# Patient Record
(demographics unavailable — no encounter records)

---

## 2024-12-12 NOTE — PHYSICAL EXAM
[General Appearance - Alert] : alert [General Appearance - Well-Appearing] : well appearing [Appearance Of Head] : the head was normocephalic [PERRL With Normal Accommodation] : pupils were equal in size, round, reactive to light, with normal accommodation [Both Tympanic Membranes Were Examined] : both tympanic membranes were normal [Neck Cervical Mass (___cm)] : no neck mass was observed [Auscultation Breath Sounds / Voice Sounds] : clear bilateral breath sounds [Heart Sounds] : normal S1 and S2 [Murmurs] : no murmurs [Bowel Sounds] : normal bowel sounds [Abdomen Soft] : soft [Abnormal Walk] : normal gait [No Visual Abnormalities] : no visible abnormailities [Generalized Lymph Node Enlargement] : no lymphadenopathy [] : no significant rash [Penis Abnormality] : the penis was normal [Scrotum] : the scrotum was normal [Prince Stage _____] : the Prince stage for pubic hair development was [unfilled]  [FreeTextEntry1] : GETS ANXIOUS

## 2024-12-12 NOTE — DISCUSSION/SUMMARY
[FreeTextEntry1] : DISCUSSED NEEDED EVALUATION AND SERVICES REFERRALS PLACED WILL COMPLETE M11Q  WELL VISIT DUE AUGUST 2025

## 2024-12-12 NOTE — DEVELOPMENTAL MILESTONES
[FreeTextEntry3] : LIMITED DIET, MOSTLY STARCHES.  USES A COMMUNICATION DEVICE, LIMITED VERBAL SKILLS NEEDS ASSISTANCE WITH ALL ADLS. IS TOILET TRAINED DAY AND NIGHT PREFERS TO PLAY ALONE BUT IS NOT AGGRESSIVE WITH OTHER KIDS

## 2024-12-12 NOTE — COUNSELING
[Use of Plain Language] : use of plain language [Needs Reinforcement, Referred] : needs reinforcement, referred [FreeTextEntry3] : ACCESS TO CARE

## 2024-12-12 NOTE — HISTORY OF PRESENT ILLNESS
[New - [facility] Care] : a new patient visit to establish care [Mother] : mother [FreeTextEntry1] : AUTISM DX AT AGE 3 IN [place], NEEDS TO ESTABLISH MULTISPECIALTY FOLLOW UP AND CARE HERE TO CONFIRM DIAGNOSIS IN ORDER TO GET SERVICES [FreeTextEntry2] : REFERRED BY MATERNAL UNCLE ( CURRENT PT)  [TextEntry] : BHX:  FT 38 WEEKS INDUCED ( [facility])  7LBS 15 OZ    MEDHX: PHOTOTHERAPY, SPEECH DELAY   AUTISM ( DX AT AGE 3 YEARS) HOSP: FLU  MEDS: NONE ALL: NONE IMM : UTD DEVELOP: SPEECH DELAY AT 18 MONTHS ( DELAYED CARE DUE TO THE PANDEMIC ) INCONSISTENT THERAPIES FROM AGE 3 YEARS, PART OF THE REASON THE [facility]

## 2024-12-20 NOTE — PHYSICAL EXAM
[Well-appearing] : well-appearing [Normocephalic] : normocephalic [No ocular abnormalities] : no ocular abnormalities [Neck supple] : neck supple [No abnormal neurocutaneous stigmata or skin lesions] : no abnormal neurocutaneous stigmata or skin lesions [Straight] : straight [No deformities] : no deformities [Alert] : alert [VFF] : VFF [Pupils reactive to light and accommodation] : pupils reactive to light and accommodation [Full extraocular movements] : full extraocular movements [No nystagmus] : no nystagmus [Normal facial sensation to light touch] : normal facial sensation to light touch [No facial asymmetry or weakness] : no facial asymmetry or weakness [Equal palate elevation] : equal palate elevation [Good shoulder shrug] : good shoulder shrug [Normal tongue movement] : normal tongue movement [Midline tongue, no fasciculations] : midline tongue, no fasciculations [Normal axial and appendicular muscle tone] : normal axial and appendicular muscle tone [Gets up on table without difficulty] : gets up on table without difficulty [Normal finger tapping and fine finger movements] : normal finger tapping and fine finger movements [No abnormal involuntary movements] : no abnormal involuntary movements [5/5 strength in proximal and distal muscles of arms and legs] : 5/5 strength in proximal and distal muscles of arms and legs [2+ biceps] : 2+ biceps [Knee jerks] : knee jerks [Ankle jerks] : ankle jerks [No ankle clonus] : no ankle clonus [No dysmetria on FTNT] : no dysmetria on FTNT [Good walking balance] : good walking balance [Normal gait] : normal gait [de-identified] : patient in no apparent distress  [de-identified] : no resp distress, no retractions  [de-identified] : poor eye contact [de-identified] : non verbal, following some simple commands with repetition and prompting [de-identified] : walks well [de-identified] : Localizes touch and tickle

## 2024-12-20 NOTE — HISTORY OF PRESENT ILLNESS
[FreeTextEntry1] : Presenting for initial evaluation to transfer care of autism spectrum disorder and developmental delays  Patient met early developmental milestones, but noted to have speech regression at 1 year old - previously saying " hi, bye, mommy, daddy", but stopped speaking - Normal audiology evaluation - referred for ST around 1.4 yo but service denied by insurance Suspected autism spectrum disorder at 2 years old  Services:  Formal dx in 2020 after psychologic evaluation by NP ST, OT - after pandemic until Sept 2024 - spotty therapy due to providers LEONARD prior to 1st grade x2 months (40 hours per week) - notable improvement while receiving LEONARD, but has not been able to resume   Development Communicated with gesturing, pointing, using ACC Delayed fine motor development  Normal gross motor development  New concerns:  Episodes of fluttering eyelids and forceful eye blink with finger posturing  - onset within the past year - episodes occurring daily Echolalia within the past year   Consultants:  ENT - Snoring and mouth breathing - pending  Dev peds - pending Neuropsychology evaluation - waitlist for 2026 LEONADR therapy - requesting diagnosis letter from provider (not accepting evaluation from NP in [place])

## 2024-12-20 NOTE — PHYSICAL EXAM
[Well-appearing] : well-appearing [Normocephalic] : normocephalic [No ocular abnormalities] : no ocular abnormalities [Neck supple] : neck supple [No abnormal neurocutaneous stigmata or skin lesions] : no abnormal neurocutaneous stigmata or skin lesions [Straight] : straight [No deformities] : no deformities [Alert] : alert [VFF] : VFF [Pupils reactive to light and accommodation] : pupils reactive to light and accommodation [Full extraocular movements] : full extraocular movements [No nystagmus] : no nystagmus [Normal facial sensation to light touch] : normal facial sensation to light touch [No facial asymmetry or weakness] : no facial asymmetry or weakness [Equal palate elevation] : equal palate elevation [Good shoulder shrug] : good shoulder shrug [Normal tongue movement] : normal tongue movement [Midline tongue, no fasciculations] : midline tongue, no fasciculations [Normal axial and appendicular muscle tone] : normal axial and appendicular muscle tone [Gets up on table without difficulty] : gets up on table without difficulty [Normal finger tapping and fine finger movements] : normal finger tapping and fine finger movements [No abnormal involuntary movements] : no abnormal involuntary movements [5/5 strength in proximal and distal muscles of arms and legs] : 5/5 strength in proximal and distal muscles of arms and legs [2+ biceps] : 2+ biceps [Knee jerks] : knee jerks [Ankle jerks] : ankle jerks [No ankle clonus] : no ankle clonus [No dysmetria on FTNT] : no dysmetria on FTNT [Good walking balance] : good walking balance [Normal gait] : normal gait [de-identified] : patient in no apparent distress  [de-identified] : no resp distress, no retractions  [de-identified] : poor eye contact [de-identified] : non verbal, following some simple commands with repetition and prompting [de-identified] : walks well [de-identified] : Localizes touch and tickle

## 2024-12-20 NOTE — HISTORY OF PRESENT ILLNESS
[FreeTextEntry1] : Presenting for initial evaluation to transfer care of autism spectrum disorder and developmental delays  Patient met early developmental milestones, but noted to have speech regression at 1 year old - previously saying " hi, bye, mommy, daddy", but stopped speaking - Normal audiology evaluation - referred for ST around 1.4 yo but service denied by insurance Suspected autism spectrum disorder at 2 years old  Services:  Formal dx in 2020 after psychologic evaluation by NP ST, OT - after pandemic until Sept 2024 - spotty therapy due to providers LEONARD prior to 1st grade x2 months (40 hours per week) - notable improvement while receiving LEONARD, but has not been able to resume   Development Communicated with gesturing, pointing, using ACC Delayed fine motor development  Normal gross motor development  New concerns:  Episodes of fluttering eyelids and forceful eye blink with finger posturing  - onset within the past year - episodes occurring daily Echolalia within the past year   Consultants:  ENT - Snoring and mouth breathing - pending  Dev peds - pending Neuropsychology evaluation - waitlist for 2026 LEONARD therapy - requesting diagnosis letter from provider (not accepting evaluation from NP in [place])

## 2024-12-20 NOTE — REASON FOR VISIT
[Initial Consultation] : an initial consultation for [Autism] : Autism [Developmental Delay] : developmental delay [Family Member] : family member [Other: _____] : [unfilled]

## 2024-12-20 NOTE — ASSESSMENT
[FreeTextEntry1] : 8 year old with autism spectrum disorder and associated developmental delays in language and fine motor skills, with some repetitive movements. Neurologic examination as above. Recommend proceeding with sedated REEG and MRI brain.

## 2024-12-20 NOTE — BIRTH HISTORY
[At ___ Weeks Gestation] : at [unfilled] weeks gestation [[place] [Normal Vaginal Route] : by normal vaginal route [None] : there were no delivery complications [FreeTextEntry6] : n/a

## 2025-01-16 NOTE — PLAN
[TextEntry] : FORMES FAXED TO MRI DEPARTMENT [id number] AND EMAILED TO MOM TO VERIFY WITH NEURO MRI AND EEG ARE ON THE SAME DAY TO LIMIT ANESTHESIA

## 2025-01-16 NOTE — PHYSICAL EXAM
[General Appearance - Alert] : alert [General Appearance - Well-Appearing] : well appearing [Sclera] : the sclera were normal [Normal Appearance] : was normal in appearance [Auscultation Breath Sounds / Voice Sounds] : clear bilateral breath sounds [Heart Sounds] : normal S1 and S2 [Murmurs] : no murmurs [Abdomen Soft] : soft [Abnormal Walk] : normal gait [FreeTextEntry1] : GETS ANXIOUS

## 2025-01-16 NOTE — HISTORY OF PRESENT ILLNESS
[Preoperative Visit] : for a medical evaluation prior to surgery [Good] : Good [Prior Anesthesia] : No prior anesthesia [Prev Anesthesia Reaction] : no previous anesthesia reaction [Anesthesia Reaction] : no anesthesia reaction [FreeTextEntry1] : MRI AND EEG WITH SEDATION [FreeTextEntry2] : 1/1/25

## 2025-02-26 NOTE — ASSESSMENT
[TextEntry] : IMPRESSION:    Daniel is an 8-4 yo male diagnosed with ASD, speech delay diagnosed at 3 yo of age by DBP NP in [place] who recently moved to [place] and establishing care.  He did not receive services prior to first grade due to pandemic related delays.  He speaks mostly in single words, and uses AAC device, which has improved communication.       He was previously evaluated by Neurology for blinking. Saw Dr. Lopez 12/19/24 and had MRI and EEG, with normal results.      Current School Services: Attends 3rd grade, In self-contained class in [employer] with ST, counseling. Has AAC device.      Medical history significant for snoring.      Cousin, 5y, diagnosed with ASD, deafness- was diagnosed with Brinta-Winter syndrome.      Plan:   -Child and Adolescent Symptom Inventory-5 will be given to parent and teacher and these will be reviewed when available.   -Parent/caregiver will complete Social Responsiveness Scale-2 (SRS-2) and bring to next visit.      -Mother will follow-up for hearing evaluation.    -Parent/caregiver to bring in IEP, previous evaluations.  Will review if additional psychological eval needs to be completed for OPWDD application.    -Mother agreed to Genetics evaluation.   -Will forward special needs dentists ([facility])   -Will forward educational advocate information.   -Mother to videotape snoring.       -Follow up for part 2 of evaluation for developmental testing.      -Diagnoses and recommendations will be discussed at the end of the evaluation.      -Education about assessment: Caregiver's concerns were reviewed at length and discussion was completed about how the assessment would help in determining the underlying reasons for difficulties and needs for interventions and treatments. Counseling was done with regards to the assessment procedure, sources of information, and the range of recommendations.           These concerns can result from a range of medical, behavioral, and developmental disorders. In order to assess the underlying disorders or conditions and depending on previous evaluations, assessments may include:   -developmental testing in order to assess the child's development in multiple domains including cognition, communication, social-emotional, physical, and adaptive   -academic testins   -behavioral functioning through the use of behavioral rating scales, clinical observation, school and parent report, and use of standardized testing and neurobehavioral assessments      Referrals:   Genetics   Ophthalmology       Total time spent: 60 minutes    preparing to see the patient   obtaining new patient history including: examiner reviewed the caregiver's concerns, patient's medical and developmental history, sleep history, current functioning, school history, and feeding habits   documenting clinical information in the electronic or other health record   care coordination: discussed importance of obtaining teacher feedback (school form provided); discussed  and supports.   counseling and educating the patient/family/caregiver regarding ***       Virginia Graff MD, MPH   Developmental Behavioral Pediatrician   Board Certified in General Pediatrics and Developmental Behavioral Pediatrics by the American Board of Pediatrics

## 2025-02-26 NOTE — HISTORY OF PRESENT ILLNESS
[TextEntry] : HISTORY OF PRESENT CONCERNS:   Daniel is an 8-6 yo male diagnosed with ASD, speech delay diagnosed at 3 yo of age by [facility] NP in [place] who recently moved to [place] and establishing care.  He did not receive services prior to first grade due to pandemic related delays.  He speaks mostly in single words, and uses AAC device, which has improved communication.       He was previously evaluated by Neurology for blinking. Saw Dr. Lopez 24 and had MRI and EEG, with normal results.      Current School Services: Attends 3rd grade, In self-contained class in [employer] with ST, counseling. Has AAC device.       Private therapies: No      PREVIOUS EVALUATIONS:    IEP classification: Yes, In self-contained class in [employer] with ST, counseling.  Mom is not sure he was evaluated for OT. Has AAC device.    Mother wants him to be in a non-public school for children with ASD.        Parent will bring IEP and evaluations to upcoming visit.      Current skills and functioning reported as:    Language:    Pediatric Developmental Milestones for language were reported to be delayed.  He was saying "mommy" "daddy" "hi" "bye" at 1.5 years but didn't have ST due to insurance issue.  Received ST, OT briefly after 2y of age, and then the pandemic started so had delayed with consistent services.        He had started consistently getting services in 1st grade.       Language regression reported and now regained it.      Expressive language:    Says a few words.  Decreased intelligibility.  Doesn't speak in full sentences. Says "juice box" (but can be unclear) and then he uses his AAC and now he can communicate. Previously, he pulled and pointed to what he needed. Uses AAC "I want chips" "I want bathroom."      Improved eye contact, but still decreased.  Makes eye contact with familiar people but not consistently.       Has short attention span, easily distracted,       Receptive language:   Most times, he responds to name.     Follows 1 step command, can follow 2 step but not consistently.    Mother says she has to repeat herself for him to complete a task.       Behaviors: Parent reports activity level is very hyperactive- can't stay still.        Has not been diagnosed with ADHD.  Has long standing hyperactive behavior.       Aggressive, self-injurious behaviors: started biting himself recently.    Tantrums: more frequent; can be long- when lost one of his gloves and cried the entire ride to school.       Repetitive/restrictive behaviors or interests: Used to hand flap; sometimes walks on toes; sometimes spins.       Sensory:  He used to cover ears but better now.  Says "bye" if too much going on.       Social-emotional:     Prefers to play alone.      Play:    Plays with action figures- teenage; marvel action and sonic action.  Makes up scenes with action figures. If cousins wants to play, he has difficulty. Likes to play with ball puzzles, magna tiles.       Likes school and being around children in the home. Trouble sharing and is more sensitive.    Prefers to play by himself. Tries to bite his hands when frustrated. Mother saw improvement in his behaviors when he received LEONARD in the past over summer 3 years ago.   He attended private school in [place] since 1st grade in a specialized school. Fixated on noses- touches and grabs noses- but improved with LEONARD to almost none.          Academics:    He can spell words he wants to spell.  He knows letters; can't read. Hard to assess him because his self-distracted, short attention span, behavior. Math is a relative strength.    Uses calculator to do math.  Mother reads passage and he has difficulty with comprehension.       Motor:     Pediatric Developmental Milestones for motor:     Not aware of safety- in parking lot- he jumps around. No concerns about gross motor skills.       Activities of Daily Living:     Feeds self with utensils:  yes- but eats finger foods.    Drinks from cup:     Toilet trained:  Yes   Dresses self:  yes- with assistance   Fastens buttons:   Ties shoelaces:        Sleep:	(arrangements, ease of falling asleep and staying asleep, nightmares, enuresis):   Falls asleep fine. Bedtime at 9pm and stays asleep and wakes up at 6:30 am.    Sleeps in own bed: yes   Sleeps in own room: yes   Snoring: snores at night lightly, no coughing or choking episodes.    Enuresis: No       Eating: Very picky   Difficulty chewing, swallowing, using straw denied.    Bkfst- bite muffins   Lunch- peanut butter on bread   Dinner- hot dog with fries; chicken nuggets; dede pasta.  Doesn't like rice or chicken.  Has chips, cookies, eats fruits, minimal veggies. Does not like to try new things.  Started at 3y of age.       Screen time: During the school day about 4 h a day; more on the week. Has ipad. Watches kids youtube videos- mother had to remove [employer] browser. He can spell words. Draws and plays games. Will go to park more.        PREGNANCY AND BIRTH HISTORY:    Birthweight: 7lbs 15 oz   Gestational Age: 38w3d   Mode of Delivery: Vaginal, induced because of high blood pressure.   Mother's age at delivery: 19 years old                Father's age at delivery: 18 years old   Complications during pregnancy: None, except above   Medication: PNV   Use of teratogens (eg alcohol/cigarettes/drugs): No       Hospital: [facility] in [place]   NICU stay: No   Garrett Hearing Test: Pass       MEDICAL HISTORY:   No history of any chronic illnesses.  Denied asthma, eczema, thyroid conditions, heart conditions, kidney conditions.     Denied history of poor growth, or significant weight gain.      No history of elevated lead levels. No history of frequent ear infections.        Specialists:    Has not seen Genetics.   Huber has seen an audiologist in 2019, and had hearing wnl.  No concerns. Mother will see audiologist, has referral.      Daniel has not seen an eye doctor.    Daniel has seen a dentist 1 year ago- didn't have cavities; recommended sedation.     Genetic testing:  No    Sleep study:  No     Current Medications (including natural remedies/supplements):   No       Allergies: NKDA, NKFA       Immunization status: up to date per parent report       Hospitalizations:     Had flu and was dehydrated at 6y of age and hospitalized for 1.5 days.       Past Surgical History:    Circumcision, no problems.        FAMILY HISTORY:   Mother, no medical problems.   Father, no medical problems.       Cousin, 5y, diagnosed with ASD, deafness- was diagnosed with Brinta-Winter syndrome.        No significant family history of cardiac conditions  (heart rhythm problems, sudden death, childhood heart disease, cardiac death in family member younger than 50 years of age).       No significant family history of muscle disease.    No significant family history of thyroid disease.       SOCIAL HISTORY:   Lives with mother, maternal uncle and his wife and 3 cousins (8y older boy cousin, 3y younger boy cousin, 5 y younger girl cousin).  Feels safe in the home.    Parents have been  since Daniel's birth, part of his life. Lives in [facility].        Highest Level of Educational Attainment:   Mother -graduated high school, some technical school.   Father - graduated high school       Parents' Employment:	   Mother -admin in healthcare	   Father - culinary hospitality       Psychosocial stressors/Trauma Exposure:   1.  History of abuse, neglect, trauma denied.   2.  Major changes- move from [place]; adjusting well.        ACS involvement: Parents arguing and neighbors called- closed in 30 days. No violence in parentd' relationship.       Services:   Does child have SSI: Yes   Applied for OPWDD service:No- mother is working on it.    Does child have SNAP/food stamps: Yes        Review of Systems:   Constitutional: Negative for fever   HENT: Negative for ear pain or sore throat.  Negative for hearing or vision concerns.   Eyes: Does not wear glasses.    Respiratory: Negative for cough.     Cardiovascular: Negative for palpitations, chest pain, dizziness, fainting.     Gastrointestinal: Negative for constipation, diarrhea and vomiting.    Genitourinary: Uses toilet with assistance.   Skin: Negative for rash, dry skin, hair loss.   Neurological: Negative for seizures, tremors, headaches.  Blinks eyes- thought to be self-stimulatory.   Endo/Heme/Allergies: No food allergies.  No seasonal allergies.

## 2025-02-26 NOTE — REASON FOR VISIT
[Home] : at home, [unfilled] , at the time of the visit. [Medical Office: ([facility])___] : at the medical office located in  [Telehealth (audio & video)] : This visit was provided via telehealth using real-time 2-way audio visual technology. [Initial Consultation] : an initial consultation for [FreeTextEntry3] : Parent [TextEntry] : Date of Service: 25   TIME SPENT IN VISIT (face-to-face): 10am to 11am      Patient Name: Daniel Dowd   : -   Age: 8-4 yo   Informant: Nacho Gabriel   Contact of Informant: [email or web address]   Primary Care Provider: Dr. Freed   Languages spoken in the home: English       REASON FOR REFERRAL:     Daniel is an 8-4 yo male diagnosed with ASD, speech delay diagnosed at 3 yo of age by [facility] NP in [place] who recently moved to [facility] establishing care. Mother reports ASD diagnosis by NP is not accepted in [place] for services.       Moved to [facility] 2024 from [place] to receive better services and better quality of life. Mother wants him to get LEONARD therapy.

## 2025-02-26 NOTE — REASON FOR VISIT
[Home] : at home, [unfilled] , at the time of the visit. [Medical Office: ([facility])___] : at the medical office located in  [Telehealth (audio & video)] : This visit was provided via telehealth using real-time 2-way audio visual technology. [Initial Consultation] : an initial consultation for [FreeTextEntry3] : Parent [TextEntry] : Date of Service: 25   TIME SPENT IN VISIT (face-to-face): 10am to 11am      Patient Name: Daniel Dowd   : -   Age: 8-6 yo   Informant: Nacho Gabriel   Contact of Informant: [email or web address]   Primary Care Provider: Dr. Freed   Languages spoken in the home: English       REASON FOR REFERRAL:     Daniel is an 8-6 yo male diagnosed with ASD, speech delay diagnosed at 3 yo of age by [facility] NP in [place] who recently moved to [facility] establishing care. Mother reports ASD diagnosis by NP is not accepted in [place] for services.       Moved to [facility] 2024 from [place] to receive better services and better quality of life. Mother wants him to get LEONARD therapy.

## 2025-02-26 NOTE — HISTORY OF PRESENT ILLNESS
[TextEntry] : HISTORY OF PRESENT CONCERNS:   Daniel is an 8-4 yo male diagnosed with ASD, speech delay diagnosed at 3 yo of age by DBP NP in [place] who recently moved to [place] and establishing care.  He did not receive services prior to first grade due to pandemic related delays.  He speaks mostly in single words, and uses AAC device, which has improved communication.       He was previously evaluated by Neurology for blinking. Saw Dr. Lopez 24 and had MRI and EEG, with normal results.      Current School Services: Attends 3rd grade, In self-contained class in [employer] with ST, counseling. Has AAC device.       Private therapies: No      PREVIOUS EVALUATIONS:    IEP classification: Yes, In self-contained class in [employer] with ST, counseling.  Mom is not sure he was evaluated for OT. Has AAC device.    Mother wants him to be in a non-public school for children with ASD.        Parent will bring IEP and evaluations to upcoming visit.      Current skills and functioning reported as:    Language:    Pediatric Developmental Milestones for language were reported to be delayed.  He was saying "mommy" "daddy" "hi" "bye" at 1.5 years but didn't have ST due to insurance issue.  Received ST, OT briefly after 2y of age, and then the pandemic started so had delayed with consistent services.        He had started consistently getting services in 1st grade.       Language regression reported and now regained it.      Expressive language:    Says a few words.  Decreased intelligibility.  Doesn't speak in full sentences. Says "juice box" (but can be unclear) and then he uses his AAC and now he can communicate. Previously, he pulled and pointed to what he needed. Uses AAC "I want chips" "I want bathroom."      Improved eye contact, but still decreased.  Makes eye contact with familiar people but not consistently.       Has short attention span, easily distracted,       Receptive language:   Most times, he responds to name.     Follows 1 step command, can follow 2 step but not consistently.    Mother says she has to repeat herself for him to complete a task.       Behaviors: Parent reports activity level is very hyperactive- can't stay still.        Has not been diagnosed with ADHD.  Has long standing hyperactive behavior.       Aggressive, self-injurious behaviors: started biting himself recently.    Tantrums: more frequent; can be long- when lost one of his gloves and cried the entire ride to school.       Repetitive/restrictive behaviors or interests: Used to hand flap; sometimes walks on toes; sometimes spins.       Sensory:  He used to cover ears but better now.  Says "bye" if too much going on.       Social-emotional:     Prefers to play alone.      Play:    Plays with action figures- teenage; marvel action and sonic action.  Makes up scenes with action figures. If cousins wants to play, he has difficulty. Likes to play with ball puzzles, magna tiles.       Likes school and being around children in the home. Trouble sharing and is more sensitive.    Prefers to play by himself. Tries to bite his hands when frustrated. Mother saw improvement in his behaviors when he received LEONARD in the past over summer 3 years ago.   He attended private school in [place] since 1st grade in a specialized school. Fixated on noses- touches and grabs noses- but improved with LEONARD to almost none.          Academics:    He can spell words he wants to spell.  He knows letters; can't read. Hard to assess him because his self-distracted, short attention span, behavior. Math is a relative strength.    Uses calculator to do math.  Mother reads passage and he has difficulty with comprehension.       Motor:     Pediatric Developmental Milestones for motor:     Not aware of safety- in parking lot- he jumps around. No concerns about gross motor skills.       Activities of Daily Living:     Feeds self with utensils:  yes- but eats finger foods.    Drinks from cup:     Toilet trained:  Yes   Dresses self:  yes- with assistance   Fastens buttons:   Ties shoelaces:        Sleep:	(arrangements, ease of falling asleep and staying asleep, nightmares, enuresis):   Falls asleep fine. Bedtime at 9pm and stays asleep and wakes up at 6:30 am.    Sleeps in own bed: yes   Sleeps in own room: yes   Snoring: snores at night lightly, no coughing or choking episodes.    Enuresis: No       Eating: Very picky   Difficulty chewing, swallowing, using straw denied.    Bkfst- bite muffins   Lunch- peanut butter on bread   Dinner- hot dog with fries; chicken nuggets; dede pasta.  Doesn't like rice or chicken.  Has chips, cookies, eats fruits, minimal veggies. Does not like to try new things.  Started at 3y of age.       Screen time: During the school day about 4 h a day; more on the week. Has ipad. Watches kids youtube videos- mother had to remove VIOSO browser. He can spell words. Draws and plays games. Will go to park more.        PREGNANCY AND BIRTH HISTORY:    Birthweight: 7lbs 15 oz   Gestational Age: 38w3d   Mode of Delivery: Vaginal, induced because of high blood pressure.   Mother's age at delivery: 19 years old                Father's age at delivery: 18 years old   Complications during pregnancy: None, except above   Medication: PNV   Use of teratogens (eg alcohol/cigarettes/drugs): No       Hospital: [facility] in [place]   NICU stay: No   [place] Hearing Test: Pass       MEDICAL HISTORY:   No history of any chronic illnesses.  Denied asthma, eczema, thyroid conditions, heart conditions, kidney conditions.     Denied history of poor growth, or significant weight gain.      No history of elevated lead levels. No history of frequent ear infections.        Specialists:    Has not seen Genetics.   Huber has seen an audiologist in 2019, and had hearing wnl.  No concerns. Mother will see audiologist, has referral.      Daniel has not seen an eye doctor.    Daniel has seen a dentist 1 year ago- didn't have cavities; recommended sedation.     Genetic testing:  No    Sleep study:  No     Current Medications (including natural remedies/supplements):   No       Allergies: NKDA, NKFA       Immunization status: up to date per parent report       Hospitalizations:     Had flu and was dehydrated at 6y of age and hospitalized for 1.5 days.       Past Surgical History:    Circumcision, no problems.        FAMILY HISTORY:   Mother, no medical problems.   Father, no medical problems.       Cousin, 5y, diagnosed with ASD, deafness- was diagnosed with Brinta-Winter syndrome.        No significant family history of cardiac conditions  (heart rhythm problems, sudden death, childhood heart disease, cardiac death in family member younger than 50 years of age).       No significant family history of muscle disease.    No significant family history of thyroid disease.       SOCIAL HISTORY:   Lives with mother, maternal uncle and his wife and 3 cousins (8y older boy cousin, 3y younger boy cousin, 5 y younger girl cousin).  Feels safe in the home.    Parents have been  since Daniel's birth, part of his life. Lives in [facility].        Highest Level of Educational Attainment:   Mother -graduated high school, some technical school.   Father - graduated high school       Parents' Employment:	   Mother -admin in healthcare	   Father - culinary hospitality       Psychosocial stressors/Trauma Exposure:   1.  History of abuse, neglect, trauma denied.   2.  Major changes- move from [place]; adjusting well.        ACS involvement: Parents arguing and neighbors called- closed in 30 days. No violence in parentd' relationship.       Services:   Does child have SSI: Yes   Applied for OPWDD service:No- mother is working on it.    Does child have SNAP/food stamps: Yes        Review of Systems:   Constitutional: Negative for fever   HENT: Negative for ear pain or sore throat.  Negative for hearing or vision concerns.   Eyes: Does not wear glasses.    Respiratory: Negative for cough.     Cardiovascular: Negative for palpitations, chest pain, dizziness, fainting.     Gastrointestinal: Negative for constipation, diarrhea and vomiting.    Genitourinary: Uses toilet with assistance.   Skin: Negative for rash, dry skin, hair loss.   Neurological: Negative for seizures, tremors, headaches.  Blinks eyes- thought to be self-stimulatory.   Endo/Heme/Allergies: No food allergies.  No seasonal allergies.

## 2025-02-26 NOTE — ASSESSMENT
[TextEntry] : IMPRESSION:    Daniel is an 8-6 yo male diagnosed with ASD, speech delay diagnosed at 3 yo of age by DBP NP in [place] who recently moved to [place] and establishing care.  He did not receive services prior to first grade due to pandemic related delays.  He speaks mostly in single words, and uses AAC device, which has improved communication.       He was previously evaluated by Neurology for blinking. Saw Dr. Lopez 12/19/24 and had MRI and EEG, with normal results.      Current School Services: Attends 3rd grade, In self-contained class in [employer] with ST, counseling. Has AAC device.      Medical history significant for snoring.      Cousin, 5y, diagnosed with ASD, deafness- was diagnosed with Brinta-Winter syndrome.      Plan:   -Child and Adolescent Symptom Inventory-5 will be given to parent and teacher and these will be reviewed when available.   -Parent/caregiver will complete Social Responsiveness Scale-2 (SRS-2) and bring to next visit.      -Mother will follow-up for hearing evaluation.    -Parent/caregiver to bring in IEP, previous evaluations.  Will review if additional psychological eval needs to be completed for OPWDD application.    -Mother agreed to Genetics evaluation.   -Will forward special needs dentists ([facility])   -Will forward educational advocate information.   -Mother to videotape snoring.       -Follow up for part 2 of evaluation for developmental testing.      -Diagnoses and recommendations will be discussed at the end of the evaluation.      -Education about assessment: Caregiver's concerns were reviewed at length and discussion was completed about how the assessment would help in determining the underlying reasons for difficulties and needs for interventions and treatments. Counseling was done with regards to the assessment procedure, sources of information, and the range of recommendations.           These concerns can result from a range of medical, behavioral, and developmental disorders. In order to assess the underlying disorders or conditions and depending on previous evaluations, assessments may include:   -developmental testing in order to assess the child's development in multiple domains including cognition, communication, social-emotional, physical, and adaptive   -academic testins   -behavioral functioning through the use of behavioral rating scales, clinical observation, school and parent report, and use of standardized testing and neurobehavioral assessments      Referrals:   Genetics   Ophthalmology       Total time spent: 60 minutes    preparing to see the patient   obtaining new patient history including: examiner reviewed the caregiver's concerns, patient's medical and developmental history, sleep history, current functioning, school history, and feeding habits   documenting clinical information in the electronic or other health record   care coordination: discussed importance of obtaining teacher feedback (school form provided); discussed  and supports.   counseling and educating the patient/family/caregiver regarding ***       Virginia Graff MD, MPH   Developmental Behavioral Pediatrician   Board Certified in General Pediatrics and Developmental Behavioral Pediatrics by the American Board of Pediatrics

## 2025-03-05 NOTE — ASSESSMENT
[FreeTextEntry1] : Exam today shows intact bilateral tympanic membranes without effusion or retraction.  I reviewed an audiogram which shows normal hearing to soundfield, type a tympanograms bilaterally.I personally reviewed and interpreted MRI images and the report, which shows no internal auditory canal or cerebellopontine angle abnormalities bilaterally.  Will reattempt behavioral thresholds in 6 months to get ear specific information.  Provided reassurance no concern for hearing loss contributing to speech delay on today's exam.  May use Debrox as needed.

## 2025-03-05 NOTE — HISTORY OF PRESENT ILLNESS
[de-identified] : 8 year old male with autism, non verbal referred by Dr. Elida Freed, pediatrician for ear tugging, recurrent ear infection.  Reports intermittent ear tugging. Reports intermittent ear infections-treated with antibiotics- last occurrence was when he was 3 years old.  Denies otorrhea, hearing loss.  States has a few meaningful vocabulary- Mom. Dad.

## 2025-03-05 NOTE — BIRTH HISTORY
[At Term] : at term [Normal Vaginal Route] : by normal vaginal route [None] : No delivery complications [Passed] : passed [de-identified] : pre-eclampsia

## 2025-03-05 NOTE — REASON FOR VISIT
[Initial Consultation] : an initial consultation for [Other: _____] : [unfilled] [FreeTextEntry2] : referred by Dr. Elida Freed, pediatrician for ear tugging, recurrent ear infection.

## 2025-03-12 NOTE — PHYSICAL EXAM
[TextEntry] : General: Active, well-appearing, cooperative with most parts of the examination. No dysmorphic features.   HEENT: NC/AT. HC 67%.   Eyes:  Pupils equally responsive to light. Extra-ocular movements intact.    Ears:  Normal set.Ear canal clear.    Nose:  No deformities, clear.   Neck: Supple, no lymphadenopathy.    Mouth/Throat:  Good dentition.  Oropharynx clear.    Heart:  S1, S2. Regular rate and rhythm. No murmurs heard.    Chest: Lungs clear to auscultation bilaterally.    Abdomen: soft, non-tender, non-distended. No hepatosplenomegaly.    Spine: Clinically straight. No dimples, no nneka of hair. Slouched, diff with hopping.   Extremities: Full range of motion. No joint swelling.   Skin: Generally clear.  1 cafe au lait under each armpit   No facial rash.   : Prince 1, circ   Neuro/Gross Motor:    Reflexes 2/4 bilaterally in upper and lower extremities, plantar reflexes downgoing.   CN 2-12 grossly in tact.     Gross motor: walks with pronation      Normal muscle bulk and tone.    Does not have prominent calves.       Gait:     Narrow-based gait      Sit to stand:   From sitting position, easily stood up.     Difficulty balancing and hopping on one foot.            Behavioral observations:    During today's evaluation session, the following was observed: poorly related      Daniel impressed as a poorly related youngster who did not respond to his name and had heightened activity level and needed frequent redirection to stay on task.  he was echolalic "how are you?" He made brief eye contact and indicated cars that he wanted to play with.  Otherwise he had decreased eye contact.     He labeled "blue car."  On the Beery, he demonstrated difficulty with instructions initially and needed demonstration to complete the tasks. After provider jayne a line he wrote 2 instead of copying the line (which looked like the number.  Demonstrated 4 finger pencil grasp on right, wrote name with poor spacing.      He took saliva from mouth and put on car.  Made movements with hand in front of face; peered at car.         DEVELOPMENTAL TESTING EXTENDED (45 minutes including assessment, scoring, summary and review of results):   Li Brief Intelligence Test, Second Edition    The Li Brief Intelligence Test, Second Edition (KBIT-2) is a brief measure of the verbal and nonverbal intelligence of individuals aged 4 through 90 years. The results of the KBIT should not be used in place of more comprehensive measures of intelligence. The Verbal domain (crystallized ability) is comprised of two subtests: Verbal Knowledge and Riddles, and measures receptive vocabulary and verbal reasoning skills. The Nonverbal domain (fluid reasoning) is comprised of the Matrices subtest which measures the ability to solve new problems by perceiving relationships and completing visual analogies. The KBIT-2 yields three scores: Verbal, Nonverbal, and the overall score, the IQ Composite. These scores have a mean of 100 and a standard deviation of 15. Daniel's scores are as follows:      Daniel's overall performance on the Li Brief Intelligence Test, Second Edition (KBIT-2;3/11/24) was in the  lower extreme range with an IQ composite of 55, 0.1%, 90%CI 52-60. Daniel performed in the lower extreme range for the Verbal domain (SS 66, 1%, 90%CI 62-73, age equivalent 4-9yo) and in the lower extreme range in the Nonverbal domain (SS 50, <0.1%, 90%CI 52-60,<4y age).          Beery VMI   The Developmental Test of Visual Motor Integration, 6th Edition (Beery VMI) is a standardized tool designed to assess the extent to which individuals can integrate their visual and motor abilities (eye-hand coordination).     Visual Motor Integration (VMI) assess the extent to which children can integrate their visual and motor abilities.      Interpretation:   On the Beery Developmental Test of Visual Motor Integration, 6th Edition (Beery VMI;3/11/25), Daniel scored within the low range range, at an age-equivalent of 5-6y (SS 73).   Daniel's overall shapes were noted to be of fair quality.   Pencil Grasp: 4 finger right pencil grasp. He spelled his full name but had spacing difficulties and wrote some letters large.      Based on this assessment, Daniel presents with deficits in visual motor skills.  OT evaluation is recommended.      The Childhood Autism Rating Scale, Second edition (CARS2-ST)    The CARS2-ST is a behavioral rating scale intended to differentiate autism from other developmental delays.  It consists of 14 domains assessing behaviors associated with autism, with a 15th domain rating general impressions of autism.  Each domain is scored on a scale ranging from one to four; higher scores are associated with a higher level of impairment. Total scores can range from a low of 15 to a high of 60; scores below 30 indicate that the individual is in the non-autistic range, scores between 30 and 36.5 indicate mild to moderate autism, and scores from 37 to 60 indicate severe autism.      Relation to people=3   Imitation=2.5   Emotional response=2   Body use=3;    Object use=3   Adaptation to change=2   Visual response=3   Listening response=3   Taste/smell/touch response and use=2   Fear or nervousness=2.5    Verbal communication=3;    Nonverbal communication=2.5   Activity Level=2.5   Level and consistency of intellectual response=2.5   General impressions=3      On the Childhood Autism Rating Scale, Second edition (CARS2-ST) (3/11/25), Daniel obtained a raw score of 39.5, in the severe range of Autism Spectrum Disorder symptoms.         Diagnostic and Statistical Manual of Mental Disorders, 5th Edition   Checklist for Autism Spectrum Disorder      YES    A. Persistent deficits in social communication and social interaction across multiple contexts.      YES   1.  Deficits in social-emotional reciprocity, ranging, for example, from abnormal social approach and failure of normal back-and-forth conversation; to reduced sharing of interests, emotions, or affect; to failure to initiate or respond to social interactions.   YES   2.  Deficits in nonverbal communicative behaviors used for social interaction, ranging, for example, from poorly integrated verbal and nonverbal communication;    to abnormalities in eye contact and body language or deficits in understanding and use of gestures; to a total lack of facial expressions and nonverbal communication.   YES   3. Deficits in developing, maintaining, and understanding relationships, ranging, for example, from difficulties adjusting behavior to suit various social contexts;  to difficulties in sharing imaginative play or in making friends; to absence of interest in peers.      Deficits in social communication- 3/3   Specify current severity for social communication impairments:    Level 3.      YES/NO   B.  Restricted, repetitive patterns of behavior, interests, or activities, as manifested by at least two of the following, currently or by history.      YES   1.  Stereotyped or repetitive motor movements, use of objects, or speech    (e.g., simple motor stereotypies, lining up toys or flipping objects, echolalia,    idiosyncratic phrases).       NO   2. Insistence on sameness, inflexible adherence to routines, or ritualized patterns or verbal nonverbal behavior (e.g., extreme distress at small changes,    difficulties with transitions, rigid thinking patterns, greeting rituals,    need to take same route or eat food every day).      YES   3. Highly restricted, fixated interests that are abnormal in intensity or focus    (e.g, strong attachment to or preoccupation with unusual objects,    excessively circumscribed or perseverative interest).       YES   4.  Hyper- or hypo reactivity to sensory input or unusual interests in sensory aspects of the environment (e.g., apparent indifference to pain/temperature,    adverse response to specific sounds or textures, excessive smelling or touching of objects, visual fascination with lights or movement).      Deficits in restricted, repetitive patterns of behavior, interests, or activities- 3/4   Specify current severity for restricted, repetitive patterns of behavior:   Level 3.      YES   C. Symptoms must be present in the early developmental period (but may not become fully manifest until social demands exceed limited capacities or may be masked by learned strategies in later life).      YES   D. Symptoms cause clinically significant impairment in social, occupational, or other important areas of current functioning.      YES   E. These disturbances are not better explained by intellectual disability (intellectual developmental disorder) or global developmental delay. Intellectual disability and autism spectrum disorder frequently co-occur; to make comorbid diagnoses of autism spectrum disorder and intellectual disability, social communication should be below that expected for general developmental level.      Based on the DSM-5 diagnosis (3/11/25), Daniel meets criteria for Autism Spectrum Disorder Level 3 (Social communication) and Level 3 (Restricted, repetitive patterns of behavior, interests, or activities).

## 2025-03-12 NOTE — HISTORY OF PRESENT ILLNESS
[TextEntry] : HISTORY OF PRESENT CONCERNS:   Daniel is an 8-7 yo male diagnosed with ASD, speech delay diagnosed at 3 yo of age by DBP NP in [place] who recently moved to [place] and establishing care.  He did not receive services prior to first grade due to pandemic related delays.  He speaks mostly in single words, and uses AAC device, which has improved communication.       He was previously evaluated by Neurology for blinking. Saw Dr. Lopez 24 and had MRI and EEG, with normal results.      Current School Services: Attends 3rd grade, In self-contained class in [employer] with ST, counseling. Has AAC device.       Private therapies: No      PREVIOUS EVALUATIONS:       Daniel Dowd   IEP   Implementation date: 24   Classification: Autism       24    [facility]-T[facility] Early Cognitive and Academic Development attempted but not able tp complete due to language, attention, and delays.   [place]-3 ABC 51, <1%; Daily living skills 53   Poor, fleeting eye contact, self-directed. Made repetitive noises and spontaneous laughter.       24   Speech and language   "Intentional" communicator uses words and speech generating device. Combines 3-4 symbols to communicate.    High level of distraction, required maximal support with hand over hand   Knew body parts, sang alphabet song with some articulation errors/approximations.   Rec: 8:1:1, ST 3X30 (2 individual; 1 group); counseling 1X30X2; para 1:1       On the DUARTE-5,  mother endorsed 5 inattention, 6 hyperactive/impulsive symptoms, 0 oppositional, endorsed atypical social communication.      Mother wants him to be in a non-public school for children with ASD.        Current skills and functioning reported as:    Language:    Pediatric Developmental Milestones for language were reported to be delayed.  He was saying "mommy" "daddy" "hi" "bye" at 1.5 years but didn't have ST due to insurance issue.  Received ST, OT briefly after 2y of age, and then the pandemic started so had delayed with consistent services.        He had started consistently getting services in 1st grade.       Language regression reported and now regained it.      Expressive language:    Says a few words.  Decreased intelligibility.  Doesn't speak in full sentences. Says "juice box" (but can be unclear) and then he uses his AAC and now he can communicate. Previously, he pulled and pointed to what he needed. Uses AAC "I want chips" "I want bathroom."      Improved eye contact, but still decreased.  Makes eye contact with familiar people but not consistently.       Has short attention span, easily distracted,       Receptive language:   Most times, he responds to name.     Follows 1 step command, can follow 2 step but not consistently.    Mother says she has to repeat herself for him to complete a task.       Behaviors: Parent reports activity level is very hyperactive- can't stay still.        Has not been diagnosed with ADHD.  Has long standing hyperactive behavior.       Aggressive, self-injurious behaviors: started biting himself recently.    Tantrums: more frequent; can be long- when lost one of his gloves and cried the entire ride to school.       Repetitive/restrictive behaviors or interests: Used to hand flap; sometimes walks on toes; sometimes spins.       Sensory:  He used to cover ears but better now.  Says "bye" if too much going on.       Social-emotional:     Prefers to play alone.      Play:    Plays with action figures- teenage; marvel action and sonic action.  Makes up scenes with action figures. If cousins wants to play, he has difficulty. Likes to play with ball puzzles, magna tiles.       Likes school and being around children in the home. Trouble sharing and is more sensitive.    Prefers to play by himself. Tries to bite his hands when frustrated. Mother saw improvement in his behaviors when he received LEONARD in the past over summer 3 years ago.   He attended private school in [place] since 1st grade in a specialized school. Fixated on noses- touches and grabs noses- but improved with LEONARD to almost none.       Academics:    He can spell words he wants to spell.  He knows letters; can't read. Hard to assess him because his self-distracted, short attention span, behavior. Math is a relative strength.    Uses calculator to do math.  Mother reads passage and he has difficulty with comprehension.       Motor:     Pediatric Developmental Milestones for motor:     Not aware of safety- in parking lot- he jumps around. No concerns about gross motor skills.       Activities of Daily Living:     Feeds self with utensils:  yes- but eats finger foods.    Drinks from cup:     Toilet trained:  Yes   Dresses self:  yes- with assistance   Fastens buttons:   Ties shoelaces:        Sleep:	(arrangements, ease of falling asleep and staying asleep, nightmares, enuresis):   Falls asleep fine. Bedtime at 9pm and stays asleep and wakes up at 6:30 am.    Sleeps in own bed: yes   Sleeps in own room: yes   Snoring: snores at night lightly, no coughing or choking episodes.    Enuresis: No       Eating: Very picky   Difficulty chewing, swallowing, using straw denied.    Bkfst- bite muffins   Lunch- peanut butter on bread   Dinner- hot dog with fries; chicken nuggets; dede pasta.  Doesn't like rice or chicken.  Has chips, cookies, eats fruits, minimal veggies. Does not like to try new things.  Started at 3y of age.       Screen time: During the school day about 4 h a day; more on the week. Has ipad. Watches kids youtube videos- mother had to remove [email or web address] browser. He can spell words. Draws and plays games. Will go to park more.        PREGNANCY AND BIRTH HISTORY:    Birthweight: 7lbs 15 oz   Gestational Age: 38w3d   Mode of Delivery: Vaginal, induced because of high blood pressure.   Mother's age at delivery: 19 years old                Father's age at delivery: 18 years old   Complications during pregnancy: None, except above   Medication: PNV   Use of teratogens (eg alcohol/cigarettes/drugs): No       Hospital: [facility]   NICU stay: No    Hearing Test: Pass       MEDICAL HISTORY:   No history of any chronic illnesses.  Denied asthma, eczema, thyroid conditions, heart conditions, kidney conditions.     Denied history of poor growth, or significant weight gain.      No history of elevated lead levels. No history of frequent ear infections.        Specialists:    Genetics- referral provided 2025   Huber has seen an audiologist in 2019, and had hearing wnl.  No concerns. Mother will see audiologist, has referral.      Daniel has not seen an eye doctor.    Daniel has seen a dentist 1 year ago- didn't have cavities; recommended sedation.     Genetic testing:  No    Sleep study:  No     Current Medications (including natural remedies/supplements):   No       Allergies: NKDA, NKFA       Immunization status: up to date per parent report       Hospitalizations:     Had flu and was dehydrated at 6y of age and hospitalized for 1.5 days.       Past Surgical History:    Circumcision, no problems.        FAMILY HISTORY:   Mother, no medical problems.   Father, no medical problems.       Cousin, 5y, diagnosed with ASD, deafness- was diagnosed with Brinta-Winter syndrome.        No significant family history of cardiac conditions  (heart rhythm problems, sudden death, childhood heart disease, cardiac death in family member younger than 50 years of age).       No significant family history of muscle disease.    No significant family history of thyroid disease.       SOCIAL HISTORY:   Lives with mother, maternal uncle and his wife and 3 cousins (8y older boy cousin, 3y younger boy cousin, 5 y younger girl cousin).  Feels safe in the home.    Parents have been  since Daniel's birth, part of his life. Lives in [facility].        Highest Level of Educational Attainment:   Mother -graduated high school, some technical school.   Father - graduated high school       Parents' Employment:	   Mother -admin in healthcare	   Father - culinary hospitality       Psychosocial stressors/Trauma Exposure:   1.  History of abuse, neglect, trauma denied.   2.  Major changes- move from [place]; adjusting well.        ACS involvement: Parents arguing and neighbors called- closed in 30 days. No violence in parentd' relationship.       Services:   Does child have SSI: Yes   Applied for OPWDD service:No- mother is working on it.    Does child have SNAP/food stamps: Yes        Review of Systems:   Constitutional: Negative for fever   HENT: Negative for ear pain or sore throat.  Negative for hearing or vision concerns.   Eyes: Does not wear glasses.    Respiratory: Negative for cough.     Cardiovascular: Negative for palpitations, chest pain, dizziness, fainting.     Gastrointestinal: Negative for constipation, diarrhea and vomiting.    Genitourinary: Uses toilet with assistance.   Skin: Negative for rash, dry skin, hair loss.   Neurological: Negative for seizures, tremors, headaches.  Blinks eyes- thought to be self-stimulatory.   Endo/Heme/Allergies: No food allergies.  No seasonal allergies.

## 2025-03-12 NOTE — PHYSICAL EXAM
[TextEntry] : General: Active, well-appearing, cooperative with most parts of the examination. No dysmorphic features.   HEENT: NC/AT. HC 67%.   Eyes:  Pupils equally responsive to light. Extra-ocular movements intact.    Ears:  Normal set.Ear canal clear.    Nose:  No deformities, clear.   Neck: Supple, no lymphadenopathy.    Mouth/Throat:  Good dentition.  Oropharynx clear.    Heart:  S1, S2. Regular rate and rhythm. No murmurs heard.    Chest: Lungs clear to auscultation bilaterally.    Abdomen: soft, non-tender, non-distended. No hepatosplenomegaly.    Spine: Clinically straight. No dimples, no nneka of hair. Slouched, diff with hopping.   Extremities: Full range of motion. No joint swelling.   Skin: Generally clear.  1 cafe au lait under each armpit   No facial rash.   : Prince 1, circ   Neuro/Gross Motor:    Reflexes 2/4 bilaterally in upper and lower extremities, plantar reflexes downgoing.   CN 2-12 grossly in tact.     Gross motor: walks with pronation      Normal muscle bulk and tone.    Does not have prominent calves.       Gait:     Narrow-based gait      Sit to stand:   From sitting position, easily stood up.     Difficulty balancing and hopping on one foot.            Behavioral observations:    During today's evaluation session, the following was observed: poorly related      Daniel impressed as a poorly related youngster who did not respond to his name and had heightened activity level and needed frequent redirection to stay on task.  he was echolalic "how are you?" He made brief eye contact and indicated cars that he wanted to play with.  Otherwise he had decreased eye contact.     He labeled "blue car."  On the Beery, he demonstrated difficulty with instructions initially and needed demonstration to complete the tasks. After provider jayne a line he wrote 2 instead of copying the line (which looked like the number.  Demonstrated 4 finger pencil grasp on right, wrote name with poor spacing.      He took saliva from mouth and put on car.  Made movements with hand in front of face; peered at car.         DEVELOPMENTAL TESTING EXTENDED (45 minutes including assessment, scoring, summary and review of results):   Li Brief Intelligence Test, Second Edition    The Li Brief Intelligence Test, Second Edition (KBIT-2) is a brief measure of the verbal and nonverbal intelligence of individuals aged 4 through 90 years. The results of the KBIT should not be used in place of more comprehensive measures of intelligence. The Verbal domain (crystallized ability) is comprised of two subtests: Verbal Knowledge and Riddles, and measures receptive vocabulary and verbal reasoning skills. The Nonverbal domain (fluid reasoning) is comprised of the Matrices subtest which measures the ability to solve new problems by perceiving relationships and completing visual analogies. The KBIT-2 yields three scores: Verbal, Nonverbal, and the overall score, the IQ Composite. These scores have a mean of 100 and a standard deviation of 15. Daniel's scores are as follows:      Daniel's overall performance on the Li Brief Intelligence Test, Second Edition (KBIT-2;3/11/24) was in the  lower extreme range with an IQ composite of 55, 0.1%, 90%CI 52-60. Daniel performed in the lower extreme range for the Verbal domain (SS 66, 1%, 90%CI 62-73, age equivalent 4-7yo) and in the lower extreme range in the Nonverbal domain (SS 50, <0.1%, 90%CI 52-60,<4y age).          Beery VMI   The Developmental Test of Visual Motor Integration, 6th Edition (Beery VMI) is a standardized tool designed to assess the extent to which individuals can integrate their visual and motor abilities (eye-hand coordination).     Visual Motor Integration (VMI) assess the extent to which children can integrate their visual and motor abilities.      Interpretation:   On the Beery Developmental Test of Visual Motor Integration, 6th Edition (Beery VMI;3/11/25), Daniel scored within the low range range, at an age-equivalent of 5-6y (SS 73).   Daniel's overall shapes were noted to be of fair quality.   Pencil Grasp: 4 finger right pencil grasp. He spelled his full name but had spacing difficulties and wrote some letters large.      Based on this assessment, Daniel presents with deficits in visual motor skills.  OT evaluation is recommended.      The Childhood Autism Rating Scale, Second edition (CARS2-ST)    The CARS2-ST is a behavioral rating scale intended to differentiate autism from other developmental delays.  It consists of 14 domains assessing behaviors associated with autism, with a 15th domain rating general impressions of autism.  Each domain is scored on a scale ranging from one to four; higher scores are associated with a higher level of impairment. Total scores can range from a low of 15 to a high of 60; scores below 30 indicate that the individual is in the non-autistic range, scores between 30 and 36.5 indicate mild to moderate autism, and scores from 37 to 60 indicate severe autism.      Relation to people=3   Imitation=2.5   Emotional response=2   Body use=3;    Object use=3   Adaptation to change=2   Visual response=3   Listening response=3   Taste/smell/touch response and use=2   Fear or nervousness=2.5    Verbal communication=3;    Nonverbal communication=2.5   Activity Level=2.5   Level and consistency of intellectual response=2.5   General impressions=3      On the Childhood Autism Rating Scale, Second edition (CARS2-ST) (3/11/25), Daniel obtained a raw score of 39.5, in the severe range of Autism Spectrum Disorder symptoms.         Diagnostic and Statistical Manual of Mental Disorders, 5th Edition   Checklist for Autism Spectrum Disorder      YES    A. Persistent deficits in social communication and social interaction across multiple contexts.      YES   1.  Deficits in social-emotional reciprocity, ranging, for example, from abnormal social approach and failure of normal back-and-forth conversation; to reduced sharing of interests, emotions, or affect; to failure to initiate or respond to social interactions.   YES   2.  Deficits in nonverbal communicative behaviors used for social interaction, ranging, for example, from poorly integrated verbal and nonverbal communication;    to abnormalities in eye contact and body language or deficits in understanding and use of gestures; to a total lack of facial expressions and nonverbal communication.   YES   3. Deficits in developing, maintaining, and understanding relationships, ranging, for example, from difficulties adjusting behavior to suit various social contexts;  to difficulties in sharing imaginative play or in making friends; to absence of interest in peers.      Deficits in social communication- 3/3   Specify current severity for social communication impairments:    Level 3.      YES/NO   B.  Restricted, repetitive patterns of behavior, interests, or activities, as manifested by at least two of the following, currently or by history.      YES   1.  Stereotyped or repetitive motor movements, use of objects, or speech    (e.g., simple motor stereotypies, lining up toys or flipping objects, echolalia,    idiosyncratic phrases).       NO   2. Insistence on sameness, inflexible adherence to routines, or ritualized patterns or verbal nonverbal behavior (e.g., extreme distress at small changes,    difficulties with transitions, rigid thinking patterns, greeting rituals,    need to take same route or eat food every day).      YES   3. Highly restricted, fixated interests that are abnormal in intensity or focus    (e.g, strong attachment to or preoccupation with unusual objects,    excessively circumscribed or perseverative interest).       YES   4.  Hyper- or hypo reactivity to sensory input or unusual interests in sensory aspects of the environment (e.g., apparent indifference to pain/temperature,    adverse response to specific sounds or textures, excessive smelling or touching of objects, visual fascination with lights or movement).      Deficits in restricted, repetitive patterns of behavior, interests, or activities- 3/4   Specify current severity for restricted, repetitive patterns of behavior:   Level 3.      YES   C. Symptoms must be present in the early developmental period (but may not become fully manifest until social demands exceed limited capacities or may be masked by learned strategies in later life).      YES   D. Symptoms cause clinically significant impairment in social, occupational, or other important areas of current functioning.      YES   E. These disturbances are not better explained by intellectual disability (intellectual developmental disorder) or global developmental delay. Intellectual disability and autism spectrum disorder frequently co-occur; to make comorbid diagnoses of autism spectrum disorder and intellectual disability, social communication should be below that expected for general developmental level.      Based on the DSM-5 diagnosis (3/11/25), Daniel meets criteria for Autism Spectrum Disorder Level 3 (Social communication) and Level 3 (Restricted, repetitive patterns of behavior, interests, or activities).

## 2025-03-12 NOTE — ASSESSMENT
[TextEntry] : IMPRESSION:   Daniel is an 8-5 yo male diagnosed with ASD, speech delay diagnosed at 3 yo of age by DBP NP in [place] who recently moved to [place] and establishing care.  He did not receive services prior to first grade due to pandemic related delays.  He speaks mostly in single words, and uses AAC device, which has improved communication.    He was previously evaluated by Neurology for blinking. Saw Dr. Lopez 12/19/24 and had MRI and EEG, with normal results.   Current School Services: Attends 3rd grade, In self-contained class in [employer] with , counseling. Has AAC device.   Medical history significant for snoring.  Cousin, 5y, diagnosed with ASD, deafness- was diagnosed with Brinta-Winter syndrome. On evaluation (3/11/25), he impressed as a poorly related youngster who was echolalic and had language delay and heightened activity r/o ADHD.       Plan:   -Discussed diagnoses and results of testing   -Awaiting school report to inform ADHD-C diagnosis. Mother requested revisit in 2-3 weeks to discuss school report (she will provide it beforeheand)   Due to Daniel's significant delays in cognitive, social, and adaptive functioning, he requires a small, specialized class in a specialized school that provides LEONARD methodology in the educational setting to learn skills that can be applied in the classroom as well as generalized to other environments.  He also needs 20 hours of LEONARD in the community setting to work on pivotal developmental goals that promote motivation, communication, and independence.    Daniel needs a 12 month program to support learning and language and to prevent decline of language and academics.     Recommend intensive speech therapy (4 times a week) and recommend carrying over communication strategies to the home.    Continue use of AAC device   Continue behavior paraprofessional recommended to ensure his safety and that he stays on task.    Recommend occupational therapy evaluation to address Daniel's sensory seeking behaviors and hyperactivity which can impact his learning and to provide supportive strategies so he can have improved focus on the learning task at hand.    Recommend PT evaluation as Daniel demonstrates gross motor delays.    Provided information on OPWDD and psychosocial supports in the community.   Revisit with updated school report (r/o ADHD) in 2-3 weeks   -Will provide letters with educational recommendations and for LEONARD   -Referred to Genetics and provided educational advocates information   -Mother to videotape snoring.       Spoke with Cadence Sotomayor tel. [phone]; [email or web address]. (ESTEBAN worker) per mother's request who said Nonverbal testing requested (C-[email or web address]).  Will order.      Total Time Spent: 45 minutes    preparing to see the patient   obtaining patient interim history including: examiner reviewed the caregiver's concerns, interim medical and school history.   performing a medically appropriate examination and/or evaluation   documenting clinical information in the electronic or other health record   care coordination: discussed importance of obtaining teacher feedback (school form provided); referral to ; will provide letter with recommendations; spoke to ESTEBAN worker below   counseling and educating the patient/family/caregiver regarding diagnoses and treatment.       Spent 15 min on reviewing and summarizing previous evaluations, IEP.      Virginia Graff MD, MPH   Developmental Behavioral Pediatrician   Board Certified in General Pediatrics and Developmental Behavioral Pediatrics by the American Board of Pediatrics

## 2025-03-12 NOTE — HISTORY OF PRESENT ILLNESS
[TextEntry] : HISTORY OF PRESENT CONCERNS:   Daniel is an 8-5 yo male diagnosed with ASD, speech delay diagnosed at 3 yo of age by [facility] NP in [place] who recently moved to [place] and establishing care.  He did not receive services prior to first grade due to pandemic related delays.  He speaks mostly in single words, and uses AAC device, which has improved communication.       He was previously evaluated by Neurology for blinking. Saw Dr. Lopez 24 and had MRI and EEG, with normal results.      Current School Services: Attends 3rd grade, In self-contained class in [employer] with ST, counseling. Has AAC device.       Private therapies: No      PREVIOUS EVALUATIONS:       Daniel Dowd   IEP   Implementation date: 24   Classification: Autism       24    [facility] Early Cognitive and Academic Development attempted but not able tp complete due to language, attention, and delays.   [place]-3 ABC 51, <1%; Daily living skills 53   Poor, fleeting eye contact, self-directed. Made repetitive noises and spontaneous laughter.       24   Speech and language   "Intentional" communicator uses words and speech generating device. Combines 3-4 symbols to communicate.    High level of distraction, required maximal support with hand over hand   Knew body parts, sang alphabet song with some articulation errors/approximations.   Rec: 8:1:1, ST 3X30 (2 individual; 1 group); counseling 1X30X2; para 1:1       On the DUARTE-5,  mother endorsed 5 inattention, 6 hyperactive/impulsive symptoms, 0 oppositional, endorsed atypical social communication.      Mother wants him to be in a non-public school for children with ASD.        Current skills and functioning reported as:    Language:    Pediatric Developmental Milestones for language were reported to be delayed.  He was saying "mommy" "daddy" "hi" "bye" at 1.5 years but didn't have ST due to insurance issue.  Received ST, OT briefly after 2y of age, and then the pandemic started so had delayed with consistent services.        He had started consistently getting services in 1st grade.       Language regression reported and now regained it.      Expressive language:    Says a few words.  Decreased intelligibility.  Doesn't speak in full sentences. Says "juice box" (but can be unclear) and then he uses his AAC and now he can communicate. Previously, he pulled and pointed to what he needed. Uses AAC "I want chips" "I want bathroom."      Improved eye contact, but still decreased.  Makes eye contact with familiar people but not consistently.       Has short attention span, easily distracted,       Receptive language:   Most times, he responds to name.     Follows 1 step command, can follow 2 step but not consistently.    Mother says she has to repeat herself for him to complete a task.       Behaviors: Parent reports activity level is very hyperactive- can't stay still.        Has not been diagnosed with ADHD.  Has long standing hyperactive behavior.       Aggressive, self-injurious behaviors: started biting himself recently.    Tantrums: more frequent; can be long- when lost one of his gloves and cried the entire ride to school.       Repetitive/restrictive behaviors or interests: Used to hand flap; sometimes walks on toes; sometimes spins.       Sensory:  He used to cover ears but better now.  Says "bye" if too much going on.       Social-emotional:     Prefers to play alone.      Play:    Plays with action figures- teenage; marvel action and sonic action.  Makes up scenes with action figures. If cousins wants to play, he has difficulty. Likes to play with ball puzzles, magna tiles.       Likes school and being around children in the home. Trouble sharing and is more sensitive.    Prefers to play by himself. Tries to bite his hands when frustrated. Mother saw improvement in his behaviors when he received LEONARD in the past over summer 3 years ago.   He attended private school in [place] since 1st grade in a specialized school. Fixated on noses- touches and grabs noses- but improved with LEONARD to almost none.       Academics:    He can spell words he wants to spell.  He knows letters; can't read. Hard to assess him because his self-distracted, short attention span, behavior. Math is a relative strength.    Uses calculator to do math.  Mother reads passage and he has difficulty with comprehension.       Motor:     Pediatric Developmental Milestones for motor:     Not aware of safety- in parking lot- he jumps around. No concerns about gross motor skills.       Activities of Daily Living:     Feeds self with utensils:  yes- but eats finger foods.    Drinks from cup:     Toilet trained:  Yes   Dresses self:  yes- with assistance   Fastens buttons:   Ties shoelaces:        Sleep:	(arrangements, ease of falling asleep and staying asleep, nightmares, enuresis):   Falls asleep fine. Bedtime at 9pm and stays asleep and wakes up at 6:30 am.    Sleeps in own bed: yes   Sleeps in own room: yes   Snoring: snores at night lightly, no coughing or choking episodes.    Enuresis: No       Eating: Very picky   Difficulty chewing, swallowing, using straw denied.    Bkfst- bite muffins   Lunch- peanut butter on bread   Dinner- hot dog with fries; chicken nuggets; dede pasta.  Doesn't like rice or chicken.  Has chips, cookies, eats fruits, minimal veggies. Does not like to try new things.  Started at 3y of age.       Screen time: During the school day about 4 h a day; more on the week. Has ipad. Watches kids youtube videos- mother had to remove [employer] browser. He can spell words. Draws and plays games. Will go to park more.        PREGNANCY AND BIRTH HISTORY:    Birthweight: 7lbs 15 oz   Gestational Age: 38w3d   Mode of Delivery: Vaginal, induced because of high blood pressure.   Mother's age at delivery: 19 years old                Father's age at delivery: 18 years old   Complications during pregnancy: None, except above   Medication: PNV   Use of teratogens (eg alcohol/cigarettes/drugs): No       Hospital: [facility]   NICU stay: No    Hearing Test: Pass       MEDICAL HISTORY:   No history of any chronic illnesses.  Denied asthma, eczema, thyroid conditions, heart conditions, kidney conditions.     Denied history of poor growth, or significant weight gain.      No history of elevated lead levels. No history of frequent ear infections.        Specialists:    Genetics- referral provided 2025   Huber has seen an audiologist in 2019, and had hearing wnl.  No concerns. Mother will see audiologist, has referral.      Daniel has not seen an eye doctor.    Daniel has seen a dentist 1 year ago- didn't have cavities; recommended sedation.     Genetic testing:  No    Sleep study:  No     Current Medications (including natural remedies/supplements):   No       Allergies: NKDA, NKFA       Immunization status: up to date per parent report       Hospitalizations:     Had flu and was dehydrated at 6y of age and hospitalized for 1.5 days.       Past Surgical History:    Circumcision, no problems.        FAMILY HISTORY:   Mother, no medical problems.   Father, no medical problems.       Cousin, 5y, diagnosed with ASD, deafness- was diagnosed with Brinta-Winter syndrome.        No significant family history of cardiac conditions  (heart rhythm problems, sudden death, childhood heart disease, cardiac death in family member younger than 50 years of age).       No significant family history of muscle disease.    No significant family history of thyroid disease.       SOCIAL HISTORY:   Lives with mother, maternal uncle and his wife and 3 cousins (8y older boy cousin, 3y younger boy cousin, 5 y younger girl cousin).  Feels safe in the home.    Parents have been  since Daniel's birth, part of his life. Lives in [facility] [place].        Highest Level of Educational Attainment:   Mother -graduated high school, some technical school.   Father - graduated high school       Parents' Employment:	   Mother -admin in healthcare	   Father - culinary hospitality       Psychosocial stressors/Trauma Exposure:   1.  History of abuse, neglect, trauma denied.   2.  Major changes- move from [place]; adjusting well.        ACS involvement: Parents arguing and neighbors called- closed in 30 days. No violence in parentd' relationship.       Services:   Does child have SSI: Yes   Applied for OPWDD service:No- mother is working on it.    Does child have SNAP/food stamps: Yes        Review of Systems:   Constitutional: Negative for fever   HENT: Negative for ear pain or sore throat.  Negative for hearing or vision concerns.   Eyes: Does not wear glasses.    Respiratory: Negative for cough.     Cardiovascular: Negative for palpitations, chest pain, dizziness, fainting.     Gastrointestinal: Negative for constipation, diarrhea and vomiting.    Genitourinary: Uses toilet with assistance.   Skin: Negative for rash, dry skin, hair loss.   Neurological: Negative for seizures, tremors, headaches.  Blinks eyes- thought to be self-stimulatory.   Endo/Heme/Allergies: No food allergies.  No seasonal allergies.

## 2025-03-12 NOTE — REASON FOR VISIT
[TextEntry] : Date of Service: 3/11/25   TIME SPENT IN VISIT (face-to-face): 10:05am to 11am   Patient Name: Daniel Dowd   : 2016   Age: 8-5 yo   Informant: Mr. Gabriel (uncle) and Kate Gabriel (mother, on phone)   Contact of Informant: [email or web address]   Primary Care Provider: Dr. Freed   Languages spoken in the home: English       REASON FOR REFERRAL:     Daniel is an 8-5 yo male diagnosed with ASD, speech delay diagnosed at 3 yo of age by [facility] NP in [place] who recently moved to [place] and establishing care. Mother reports ASD diagnosis by NP is not accepted in [place] for services.       Moved to [facility] 2024 from [place] to receive better services and better quality of life. Mother wants him to get LEONARD therapy.

## 2025-03-12 NOTE — ASSESSMENT
[TextEntry] : IMPRESSION:   Daniel is an 8-7 yo male diagnosed with ASD, speech delay diagnosed at 3 yo of age by [facility] NP in [place] who recently moved to [place] and establishing care.  He did not receive services prior to first grade due to pandemic related delays.  He speaks mostly in single words, and uses AAC device, which has improved communication.    He was previously evaluated by Neurology for blinking. Saw Dr. Lopez 12/19/24 and had MRI and EEG, with normal results.   Current School Services: Attends 3rd grade, In self-contained class in [employer] with , counseling. Has AAC device.   Medical history significant for snoring.  Cousin, 5y, diagnosed with ASD, deafness- was diagnosed with Brinta-Winter syndrome. On evaluation (3/11/25), he impressed as a poorly related youngster who was echolalic and had language delay and heightened activity r/o ADHD.       Plan:   -Discussed diagnoses and results of testing   -Awaiting school report to inform ADHD-C diagnosis. Mother requested revisit in 2-3 weeks to discuss school report (she will provide it beforeheand)   Due to Daniel's significant delays in cognitive, social, and adaptive functioning, he requires a small, specialized class in a specialized school that provides LEONARD methodology in the educational setting to learn skills that can be applied in the classroom as well as generalized to other environments.  He also needs 20 hours of LEONARD in the community setting to work on pivotal developmental goals that promote motivation, communication, and independence.    Daniel needs a 12 month program to support learning and language and to prevent decline of language and academics.     Recommend intensive speech therapy (4 times a week) and recommend carrying over communication strategies to the home.    Continue use of AAC device   Continue behavior paraprofessional recommended to ensure his safety and that he stays on task.    Recommend occupational therapy evaluation to address Daniel's sensory seeking behaviors and hyperactivity which can impact his learning and to provide supportive strategies so he can have improved focus on the learning task at hand.    Recommend PT evaluation as Daniel demonstrates gross motor delays.    Provided information on OPWDD and psychosocial supports in the community.   Revisit with updated school report (r/o ADHD) in 2-3 weeks   -Will provide letters with educational recommendations and for LEONARD   -Referred to Genetics and provided educational advocates information   -Mother to videotape snoring.       Spoke with Cadence Sotomayor tel. [phone]; [email or web address]. (ESTEBAN worker) per mother's request who said Nonverbal testing requested (C-[email or web address]).  Will order.      Total Time Spent: 45 minutes    preparing to see the patient   obtaining patient interim history including: examiner reviewed the caregiver's concerns, interim medical and school history.   performing a medically appropriate examination and/or evaluation   documenting clinical information in the electronic or other health record   care coordination: discussed importance of obtaining teacher feedback (school form provided); referral to ; will provide letter with recommendations; spoke to ESTEBAN worker below   counseling and educating the patient/family/caregiver regarding diagnoses and treatment.       Spent 15 min on reviewing and summarizing previous evaluations, IEP.      Virginia Graff MD, MPH   Developmental Behavioral Pediatrician   Board Certified in General Pediatrics and Developmental Behavioral Pediatrics by the American Board of Pediatrics

## 2025-03-12 NOTE — REASON FOR VISIT
[TextEntry] : Date of Service: 3/11/25   TIME SPENT IN VISIT (face-to-face): 10:05am to 11am   Patient Name: Daniel Dowd   : 2016   Age: 8-7 yo   Informant: Mr. Gabriel (uncle) and Kate Gabriel (mother, on phone)   Contact of Informant: [email or web address]   Primary Care Provider: Dr. Freed   Languages spoken in the home: English       REASON FOR REFERRAL:     Daniel is an 8-7 yo male diagnosed with ASD, speech delay diagnosed at 3 yo of age by [facility] NP in [place] who recently moved to [place] and establishing care. Mother reports ASD diagnosis by NP is not accepted in [place] for services.       Moved to [facility] 2024 from [place] to receive better services and better quality of life. Mother wants him to get LEONARD therapy.

## 2025-03-12 NOTE — REASON FOR VISIT
[TextEntry] : Date of Service: 3/11/25   TIME SPENT IN VISIT (face-to-face): 10:05am to 11am   Patient Name: Daniel Dowd   : 2016   Age: 8-7 yo   Informant: Mr. Gabriel (uncle) and Kate Gabriel (mother, on phone)   Contact of Informant: [email or web address]   Primary Care Provider: Dr. Freed   Languages spoken in the home: English       REASON FOR REFERRAL:     Daniel is an 8-7 yo male diagnosed with ASD, speech delay diagnosed at 3 yo of age by [facility] NP in [place] who recently moved to [place] and establishing care. Mother reports ASD diagnosis by NP is not accepted in [place] for services.       Moved to [facility] 2024 from [place] to receive better services and better quality of life. Mother wants him to get LEONARD therapy.

## 2025-03-12 NOTE — HISTORY OF PRESENT ILLNESS
[TextEntry] : HISTORY OF PRESENT CONCERNS:   Daniel is an 8-5 yo male diagnosed with ASD, speech delay diagnosed at 3 yo of age by DBP NP in [place] who recently moved to [place] and establishing care.  He did not receive services prior to first grade due to pandemic related delays.  He speaks mostly in single words, and uses AAC device, which has improved communication.       He was previously evaluated by Neurology for blinking. Saw Dr. Lopez 24 and had MRI and EEG, with normal results.      Current School Services: Attends 3rd grade, In self-contained class in [employer] with ST, counseling. Has AAC device.       Private therapies: No      PREVIOUS EVALUATIONS:       Daniel Dowd   IEP   Implementation date: 24   Classification: Autism       24    [facility]-T[facility] Early Cognitive and Academic Development attempted but not able tp complete due to language, attention, and delays.   [place]-3 ABC 51, <1%; Daily living skills 53   Poor, fleeting eye contact, self-directed. Made repetitive noises and spontaneous laughter.       24   Speech and language   "Intentional" communicator uses words and speech generating device. Combines 3-4 symbols to communicate.    High level of distraction, required maximal support with hand over hand   Knew body parts, sang alphabet song with some articulation errors/approximations.   Rec: 8:1:1, ST 3X30 (2 individual; 1 group); counseling 1X30X2; para 1:1       On the DUARTE-5,  mother endorsed 5 inattention, 6 hyperactive/impulsive symptoms, 0 oppositional, endorsed atypical social communication.      Mother wants him to be in a non-public school for children with ASD.        Current skills and functioning reported as:    Language:    Pediatric Developmental Milestones for language were reported to be delayed.  He was saying "mommy" "daddy" "hi" "bye" at 1.5 years but didn't have ST due to insurance issue.  Received ST, OT briefly after 2y of age, and then the pandemic started so had delayed with consistent services.        He had started consistently getting services in 1st grade.       Language regression reported and now regained it.      Expressive language:    Says a few words.  Decreased intelligibility.  Doesn't speak in full sentences. Says "juice box" (but can be unclear) and then he uses his AAC and now he can communicate. Previously, he pulled and pointed to what he needed. Uses AAC "I want chips" "I want bathroom."      Improved eye contact, but still decreased.  Makes eye contact with familiar people but not consistently.       Has short attention span, easily distracted,       Receptive language:   Most times, he responds to name.     Follows 1 step command, can follow 2 step but not consistently.    Mother says she has to repeat herself for him to complete a task.       Behaviors: Parent reports activity level is very hyperactive- can't stay still.        Has not been diagnosed with ADHD.  Has long standing hyperactive behavior.       Aggressive, self-injurious behaviors: started biting himself recently.    Tantrums: more frequent; can be long- when lost one of his gloves and cried the entire ride to school.       Repetitive/restrictive behaviors or interests: Used to hand flap; sometimes walks on toes; sometimes spins.       Sensory:  He used to cover ears but better now.  Says "bye" if too much going on.       Social-emotional:     Prefers to play alone.      Play:    Plays with action figures- teenage; marvel action and sonic action.  Makes up scenes with action figures. If cousins wants to play, he has difficulty. Likes to play with ball puzzles, magna tiles.       Likes school and being around children in the home. Trouble sharing and is more sensitive.    Prefers to play by himself. Tries to bite his hands when frustrated. Mother saw improvement in his behaviors when he received LEONARD in the past over summer 3 years ago.   He attended private school in [place] since 1st grade in a specialized school. Fixated on noses- touches and grabs noses- but improved with LEONARD to almost none.       Academics:    He can spell words he wants to spell.  He knows letters; can't read. Hard to assess him because his self-distracted, short attention span, behavior. Math is a relative strength.    Uses calculator to do math.  Mother reads passage and he has difficulty with comprehension.       Motor:     Pediatric Developmental Milestones for motor:     Not aware of safety- in parking lot- he jumps around. No concerns about gross motor skills.       Activities of Daily Living:     Feeds self with utensils:  yes- but eats finger foods.    Drinks from cup:     Toilet trained:  Yes   Dresses self:  yes- with assistance   Fastens buttons:   Ties shoelaces:        Sleep:	(arrangements, ease of falling asleep and staying asleep, nightmares, enuresis):   Falls asleep fine. Bedtime at 9pm and stays asleep and wakes up at 6:30 am.    Sleeps in own bed: yes   Sleeps in own room: yes   Snoring: snores at night lightly, no coughing or choking episodes.    Enuresis: No       Eating: Very picky   Difficulty chewing, swallowing, using straw denied.    Bkfst- bite muffins   Lunch- peanut butter on bread   Dinner- hot dog with fries; chicken nuggets; dede pasta.  Doesn't like rice or chicken.  Has chips, cookies, eats fruits, minimal veggies. Does not like to try new things.  Started at 3y of age.       Screen time: During the school day about 4 h a day; more on the week. Has ipad. Watches kids youtube videos- mother had to remove [employer] browser. He can spell words. Draws and plays games. Will go to park more.        PREGNANCY AND BIRTH HISTORY:    Birthweight: 7lbs 15 oz   Gestational Age: 38w3d   Mode of Delivery: Vaginal, induced because of high blood pressure.   Mother's age at delivery: 19 years old                Father's age at delivery: 18 years old   Complications during pregnancy: None, except above   Medication: PNV   Use of teratogens (eg alcohol/cigarettes/drugs): No       Hospital: [facility]   NICU stay: No    Hearing Test: Pass       MEDICAL HISTORY:   No history of any chronic illnesses.  Denied asthma, eczema, thyroid conditions, heart conditions, kidney conditions.     Denied history of poor growth, or significant weight gain.      No history of elevated lead levels. No history of frequent ear infections.        Specialists:    Genetics- referral provided 2025   Huber has seen an audiologist in 2019, and had hearing wnl.  No concerns. Mother will see audiologist, has referral.      Daniel has not seen an eye doctor.    Daniel has seen a dentist 1 year ago- didn't have cavities; recommended sedation.     Genetic testing:  No    Sleep study:  No     Current Medications (including natural remedies/supplements):   No       Allergies: NKDA, NKFA       Immunization status: up to date per parent report       Hospitalizations:     Had flu and was dehydrated at 6y of age and hospitalized for 1.5 days.       Past Surgical History:    Circumcision, no problems.        FAMILY HISTORY:   Mother, no medical problems.   Father, no medical problems.       Cousin, 5y, diagnosed with ASD, deafness- was diagnosed with Brinta-Winter syndrome.        No significant family history of cardiac conditions  (heart rhythm problems, sudden death, childhood heart disease, cardiac death in family member younger than 50 years of age).       No significant family history of muscle disease.    No significant family history of thyroid disease.       SOCIAL HISTORY:   Lives with mother, maternal uncle and his wife and 3 cousins (8y older boy cousin, 3y younger boy cousin, 5 y younger girl cousin).  Feels safe in the home.    Parents have been  since Daniel's birth, part of his life. Lives in [facility].        Highest Level of Educational Attainment:   Mother -graduated high school, some technical school.   Father - graduated high school       Parents' Employment:	   Mother -admin in healthcare	   Father - culinary hospitality       Psychosocial stressors/Trauma Exposure:   1.  History of abuse, neglect, trauma denied.   2.  Major changes- move from [place]; adjusting well.        ACS involvement: Parents arguing and neighbors called- closed in 30 days. No violence in parentd' relationship.       Services:   Does child have SSI: Yes   Applied for OPWDD service:No- mother is working on it.    Does child have SNAP/food stamps: Yes        Review of Systems:   Constitutional: Negative for fever   HENT: Negative for ear pain or sore throat.  Negative for hearing or vision concerns.   Eyes: Does not wear glasses.    Respiratory: Negative for cough.     Cardiovascular: Negative for palpitations, chest pain, dizziness, fainting.     Gastrointestinal: Negative for constipation, diarrhea and vomiting.    Genitourinary: Uses toilet with assistance.   Skin: Negative for rash, dry skin, hair loss.   Neurological: Negative for seizures, tremors, headaches.  Blinks eyes- thought to be self-stimulatory.   Endo/Heme/Allergies: No food allergies.  No seasonal allergies.

## 2025-03-12 NOTE — ASSESSMENT
[TextEntry] : IMPRESSION:   Daniel is an 8-5 yo male diagnosed with ASD, speech delay diagnosed at 3 yo of age by DBP NP in [place] who recently moved to [place] and establishing care.  He did not receive services prior to first grade due to pandemic related delays.  He speaks mostly in single words, and uses AAC device, which has improved communication.    He was previously evaluated by Neurology for blinking. Saw Dr. Lopez 12/19/24 and had MRI and EEG, with normal results.   Current School Services: Attends 3rd grade, In self-contained class in [employer] with , counseling. Has AAC device.   Medical history significant for snoring.  Cousin, 5y, diagnosed with ASD, deafness- was diagnosed with Brinta-Winter syndrome. On evaluation (3/11/25), he impressed as a poorly related youngster who was echolalic and had language delay and heightened activity r/o ADHD.       Plan:   -Discussed diagnoses and results of testing   -Awaiting school report to inform ADHD-C diagnosis. Mother requested revisit in 2-3 weeks to discuss school report (she will provide it beforeheand)   Due to Daniel's significant delays in cognitive, social, and adaptive functioning, he requires a small, specialized class in a specialized school that provides LEONARD methodology in the educational setting to learn skills that can be applied in the classroom as well as generalized to other environments.  He also needs 20 hours of LEONARD in the community setting to work on pivotal developmental goals that promote motivation, communication, and independence.    Daniel needs a 12 month program to support learning and language and to prevent decline of language and academics.     Recommend intensive speech therapy (4 times a week) and recommend carrying over communication strategies to the home.    Continue use of AAC device   Continue behavior paraprofessional recommended to ensure his safety and that he stays on task.    Recommend occupational therapy evaluation to address Daniel's sensory seeking behaviors and hyperactivity which can impact his learning and to provide supportive strategies so he can have improved focus on the learning task at hand.    Recommend PT evaluation as Daniel demonstrates gross motor delays.    Provided information on OPWDD and psychosocial supports in the community.   Revisit with updated school report (r/o ADHD) in 2-3 weeks   -Will provide letters with educational recommendations and for LEONARD   -Referred to Genetics and provided educational advocates information   -Mother to videotape snoring.       Spoke with Cadence Sotomayor tel. [phone]; [email or web address]. (ESTEBAN worker) per mother's request who said Nonverbal testing requested (C-YESSICA).  Will order.      Total Time Spent: 45 minutes    preparing to see the patient   obtaining patient interim history including: examiner reviewed the caregiver's concerns, interim medical and school history.   performing a medically appropriate examination and/or evaluation   documenting clinical information in the electronic or other health record   care coordination: discussed importance of obtaining teacher feedback (school form provided); referral to ; will provide letter with recommendations; spoke to ESTEBAN worker below   counseling and educating the patient/family/caregiver regarding diagnoses and treatment.       Spent 15 min on reviewing and summarizing previous evaluations, IEP.      Virginia Graff MD, MPH   Developmental Behavioral Pediatrician   Board Certified in General Pediatrics and Developmental Behavioral Pediatrics by the American Board of Pediatrics

## 2025-04-04 NOTE — PLAN
[Accuracy] : Accuracy and reliability of clinical impressions [Findings (To Date)] : Findings from evaluation (to date) [Clinical Basis] : Clinical basis for current diagnosis and clinical impressions [FreeTextEntry3] : Due to Daniel's significant delays in cognitive, social, and adaptive functioning, he requires a small, specialized class in a specialized school that provides LEONARD methodology in the educational setting to learn skills that can be applied in the classroom as well as generalized to other environments.  He also needs 20 hours of LEONARD in the community setting to work on pivotal developmental goals that promote motivation, communication, and independence.    Daniel needs a 12 month program to support learning and language and to prevent decline of language and academics.     Recommend intensive speech therapy (4 times a week) and recommend carrying over communication strategies to the home.    Continue use of AAC device   Continue behavior paraprofessional recommended to ensure his safety and that he stays on task.    Recommend occupational therapy evaluation to address Daniel's sensory seeking behaviors and hyperactivity which can impact his learning and to provide supportive strategies so he can have improved focus on the learning task at hand.    Recommend PT evaluation as Daniel demonstrates gross motor delays.    Provided information on OPWDD and psychosocial supports in the community.   Discussed ADHD-C diagnosis and that Daniel may benefit from medications.  Mother wanted to reassess after he receives LEONARD, and related services  Revisit in 3 months

## 2025-04-04 NOTE — HISTORY OF PRESENT ILLNESS
[FreeTextEntry5] : Daniel is an 8-7 yo male diagnosed with ASD, speech delay diagnosed at 3 yo of age by DBP NP in [place] who recently moved to [place] and establishing care.  He did not receive services prior to first grade due to pandemic related delays.  He speaks mostly in single words, and uses AAC device, which has improved communication.       He was previously evaluated by Neurology for blinking. Saw Dr. Lopez 12/19/24 and had MRI and EEG, with normal results.      Current School Services: Attends 3rd grade, In self-contained class in [employer] with ST, counseling. Has AAC device.       Private therapies: No     [FreeTextEntry1] : None [FreeTextEntry6] : Has been healthy

## 2025-04-04 NOTE — REASON FOR VISIT
[Home] : at home, [unfilled] , at the time of the visit. [Other Location: e.g. Home (Enter Location, City,State)___] : at [unfilled] [Telehealth (audio & video)] : This visit was provided via telehealth using real-time 2-way audio visual technology. [Follow-Up Visit] : a follow-up visit [FreeTextEntry3] : Parent [FreeTextEntry2] : 1.  Bring in school report to assess for ADHD 2.  Review results of evaluation

## 2025-06-06 NOTE — HISTORY OF PRESENT ILLNESS
[TextEntry] : Date of service: 25   Last visit: 3/11/25   TIME SPENT IN VISIT (face-to-face): 9:30am-9:55 am (audio call per mother request)   Patient Name: Daniel Dowd   : 2016   Age: 8-7 yo   Informant: Kate Gabriel (mother, on phone)   Contact of Informant: [email or web address]   Primary Care Provider: Dr. Freed   Languages spoken in the home: English      Interim:    -Advanced care worker from OPWDD said C-YESSICA was not sufficient enough.    -Want results from report- CARS   -Mom said that her extension until 6/15 and that she needed an ADOS.       Mother called Advanced care worker during this visit- spoke to Ghanshyam Rae ([facility])- not reviewer.  She is taking over the case. 5/15/25- C-YESSICA and didn't accept letter from doctor. Wanted full report with results.       -Will send testing from 3/2025.    Reviewer- Ana Hernández/Winnie [phone].      Added Ana Hernández to call who reviewed the documents needed.  Mom will forward the full evaluation completed by provider and reassess. At this time, Ana stated that additional evaluations of ADOS, JOANNE-R are not needed.      No intercurrent medical illnesses.      Will schedule revisit in 3 months to follow-up on school progress and behavior.       Total time: 20 minutes for coordination of care, review of medical history, counseling on OPWDD process.

## 2025-06-06 NOTE — REASON FOR VISIT
[Parents] : parents [Home] : at home, [unfilled] , at the time of the visit. [Medical Office: ([facility])___] : at the medical office located in  [Telephone (audio)] : This telephonic visit was provided via audio only technology. [FreeTextEntry3] : parent [Follow-Up Visit] : a follow-up visit

## 2025-07-07 NOTE — REASON FOR VISIT
[Parents] : parents [Home] : at home, [unfilled] , at the time of the visit. [Medical Office: ([facility])___] : at the medical office located in  [Telehealth (audio & video)] : This visit was provided via telehealth using real-time 2-way audio visual technology. [Follow-Up Visit] : a follow-up visit [FreeTextEntry3] : Parent

## 2025-07-07 NOTE — HISTORY OF PRESENT ILLNESS
[FreeTextEntry5] : Class: 8:1:1, ST 3X30 (2 individual; 1 group); counseling 1X30X2; para 1:1; [employer] [id number] with ST, counseling. Has AAC device.     [FreeTextEntry1] : Mother said that he is doing well at current school.  Mother feels like the school administration is supportive of him, he has become comfortable with school.  Plan to keep in current school until middle school.       Approved for OPWDD.  Needs to meet with .      Daniel is attending summer school. Mother is looking into LEONARD service- was approved by denise. Waiting for in home service.       Family moved to [place] (7/2025). Daniel will attend the same school until middle school- mother likes his school.       In May, his IEP was readjusted to start OT in Fall and he will get compensatory ST over the summer.       Waiting for bus; school is about 20 minutes away.         Behavior at home "fluctuating."  He has more intense attachment to IPAD- cousins have it.  Now he wants tablet first thing in AM.  He is having tantrums around IPAD.  Watches cartoons, draws, plays games.      Language:    Said "black tablet" to indicate AAC to go to school.    When asked what he wants, he says "noodles" and if mom doesn't understand he brings AAC device and says "I want noodles."  He liked fireworks from outside the window- he showed mother the fireworks and paired with eye contact.        ADLs- independently brushes his teeth; prompts with showering. Working on the timing. He can navigate getting ready with dressing but some assistance.   [Major Illness] : no major illness [Major Injury] : no major injury [Surgery] : no surgery [Hospitalizations] : no hospitalizations [FreeTextEntry6] : Sleep has been good. Bed at 9pm-7am.   Eating is picky, but overall eating ok.      No intercurrent illnesses. Has been healthy.      No new meds, NKDA.

## 2025-07-07 NOTE — ASSESSMENT
[TextEntry] : -Continue current class with ST therapy.   -Will email information about SEED program for child to receive OT services. Also referred to OT outside of school for services over the summer as await start of OT at school.    -F/U in 11/2025 with updated school forms (in person)